# Patient Record
(demographics unavailable — no encounter records)

---

## 2024-10-31 NOTE — HISTORY OF PRESENT ILLNESS
[FreeTextEntry1] : Comprehensive [de-identified] : Mr Dela Cruz is a 56yo male with a hx of paranoid schizophrenia, anxiety, T2DM, HTN, HLD, and previous hx of osteomyelitis and septic arthritis s/p 3 left toe amputations who presents for CPE. Pt was recently in the hospital in 8-9/2024 for a toe ulcer and was found to have septic arthritis. Underwent toe amputation at the time. Since has been feeling well and is following with podiatry. He has no specific symptoms or complaints.

## 2024-10-31 NOTE — HISTORY OF PRESENT ILLNESS
[FreeTextEntry1] : Comprehensive [de-identified] : Mr Dela Cruz is a 56yo male with a hx of paranoid schizophrenia, anxiety, T2DM, HTN, HLD, and previous hx of osteomyelitis and septic arthritis s/p 3 left toe amputations who presents for CPE. Pt was recently in the hospital in 8-9/2024 for a toe ulcer and was found to have septic arthritis. Underwent toe amputation at the time. Since has been feeling well and is following with podiatry. He has no specific symptoms or complaints.

## 2025-05-30 NOTE — PHYSICAL EXAM
[No Acute Distress] : no acute distress [Well Nourished] : well nourished [Well Developed] : well developed [PERRL] : pupils equal round and reactive to light [EOMI] : extraocular movements intact [No Respiratory Distress] : no respiratory distress  [No Accessory Muscle Use] : no accessory muscle use [Clear to Auscultation] : lungs were clear to auscultation bilaterally [Normal Rate] : normal rate  [Regular Rhythm] : with a regular rhythm [Normal S1, S2] : normal S1 and S2 [No Edema] : there was no peripheral edema [Soft] : abdomen soft [Non Tender] : non-tender [No HSM] : no HSM [Normal Bowel Sounds] : normal bowel sounds [No Joint Swelling] : no joint swelling [No Rash] : no rash [Coordination Grossly Intact] : coordination grossly intact [No Focal Deficits] : no focal deficits [Normal Gait] : normal gait [Alert and Oriented x3] : oriented to person, place, and time [Normal Mood] : the mood was normal

## 2025-05-30 NOTE — HISTORY OF PRESENT ILLNESS
[de-identified] : Mr Dela Cruz is a 60yo male with a hx of paranoid schizophrenia, anxiety, T2DM, HTN, HLD, and previous hx of osteomyelitis and septic arthritis s/p 3 left toe amputations who presents for follow up. He feels well overall with no specific symptoms or complaints. He denies any issues with adherence to his medications. Notes he's been exercising regularly and attempting to follow a healthy diet.

## 2025-05-30 NOTE — ASSESSMENT
[FreeTextEntry1] :  Case discussed with Dr. Platt.  RTC in 6 months for CPE.  Nevaeh Young MD Medicine Resident Physician PGY-2